# Patient Record
Sex: FEMALE
[De-identification: names, ages, dates, MRNs, and addresses within clinical notes are randomized per-mention and may not be internally consistent; named-entity substitution may affect disease eponyms.]

---

## 2023-03-07 ENCOUNTER — NURSE TRIAGE (OUTPATIENT)
Dept: OTHER | Facility: CLINIC | Age: 33
End: 2023-03-07

## 2023-03-08 NOTE — TELEPHONE ENCOUNTER
Location of patient: [place]    Subjective: Caller states vomiting, shortness of breath with exertion, normal fetal movement, no diabetes, is urinating, denies fever, 6 months pregnant    Current Symptoms: vomiting, is tolerating oral fluids, shortness of breath with exertion that is new    Onset: today     Associated Symptoms: reduced appetite    Pain Severity: 0/10; N/A; none    Temperature: denies fever by unknown method    What has been tried: fluids and rest    LMP:  6 months pregnant  Pregnant: Yes    Recommended disposition: See HCP within 4 Hours (or PCP triage)    Care advice provided, patient verbalizes understanding; denies any other questions or concerns; instructed to call back for any new or worsening symptoms. Encouraged to contact on call provider with OB clinic, if unavailable consider going to the ED to be seen within 4 hours. This triage is a result of a call to [address] [place] [address]. Please do not respond to the triage nurse through this encounter. Any subsequent communication should be directly with the patient.     Reason for Disposition   [1] MILD or MODERATE vomiting AND [2] present > 48 hours (2 days) (Exception: mild vomiting with associated diarrhea)   [1] MILD difficulty breathing (e.g., minimal/no SOB at rest, SOB with walking, pulse <100) AND [2] NEW-onset or WORSE than normal    Protocols used: Vomiting-ADULT-AH, Breathing Difficulty-ADULT-AH